# Patient Record
Sex: MALE | Race: WHITE | URBAN - METROPOLITAN AREA
[De-identification: names, ages, dates, MRNs, and addresses within clinical notes are randomized per-mention and may not be internally consistent; named-entity substitution may affect disease eponyms.]

---

## 2017-12-11 ENCOUNTER — HOSPITAL ENCOUNTER (OUTPATIENT)
Dept: LAB | Age: 52
Discharge: HOME | End: 2017-12-11

## 2017-12-11 PROCEDURE — 88305 TISSUE EXAM BY PATHOLOGIST: CPT | Performed by: INTERNAL MEDICINE

## 2025-07-21 ENCOUNTER — OFFICE VISIT (OUTPATIENT)
Age: 60
End: 2025-07-21

## 2025-07-21 VITALS
BODY MASS INDEX: 24.99 KG/M2 | SYSTOLIC BLOOD PRESSURE: 106 MMHG | DIASTOLIC BLOOD PRESSURE: 78 MMHG | HEIGHT: 71 IN | WEIGHT: 178.5 LBS

## 2025-07-21 DIAGNOSIS — Z02.89 ENCOUNTER FOR OCCUPATIONAL HEALTH ASSESSMENT: Primary | ICD-10-CM

## 2025-07-21 RX ORDER — LISINOPRIL 20 MG/1
20 TABLET ORAL DAILY
COMMUNITY
Start: 2024-11-22

## 2025-07-21 RX ORDER — ATORVASTATIN CALCIUM 10 MG/1
10 TABLET, FILM COATED ORAL DAILY
COMMUNITY
Start: 2024-11-22

## 2025-07-21 NOTE — PROGRESS NOTES
PROGRESS NOTE    SUBJECTIVE:   Elmo Bates Jr. is a 59 y.o. male seen in the employer based health center located at [facility] for employment physical for annual employer Health Risk Assessment and biometric screening as required to obtain their insurance premium discounts.  No complaints or concerns at this time.     Chief Complaint    HRA           OBJECTIVE:  /78 (BP Site: Left Upper Arm, Patient Position: Sitting, BP Cuff Size: Large Adult)   Ht 1.803 m (5' 11\")   Wt 81 kg (178 lb 8 oz)   BMI 24.90 kg/m²    There were no vitals filed for this visit.     Physical Exam  Constitutional:       Appearance: Normal appearance. He is well-developed and well-groomed.   Skin:     General: Skin is warm and dry.   Neurological:      General: No focal deficit present.      Mental Status: He is alert.   Psychiatric:         Attention and Perception: Attention normal.         Mood and Affect: Mood and affect normal.         Speech: Speech normal.         Behavior: Behavior normal. Behavior is cooperative.         Thought Content: Thought content normal.         ASSESSMENT and PLAN    Elmo was seen today for hra.    Diagnoses and all orders for this visit:    Encounter for occupational health assessment    Patient is completed for his HRA.       I have reviewed the patient's medication list, past medical, family, social, and surgical history in detail and updated the patient record appropriately.    ELIAN Perkins NP